# Patient Record
(demographics unavailable — no encounter records)

---

## 2025-04-29 NOTE — PLAN
[TextEntry] : CT abdomen and pelvis to assess the surgical site Follow-up in a month Reached out to Dr. Vaughn to clarify reason for referral

## 2025-04-29 NOTE — HISTORY OF PRESENT ILLNESS
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  Status post appendectomy and right hemicolectomy October 2022.  Most recent colonoscopy January 2022.  Most recent hemoglobin 12.8 most recent CEA 1.1 2022 Path report. 6.5 x 3.4 x 3.2 white-yellow cystic mass within the appendiceal lumen with yellow-green mucinous material.  Margins negative.  Not in need of treatment at that time.  February 2025 labs hemoglobin 13.4 MCV 88.8 white count 5.45 platelets 279 Normal CMP  R side bothers  no blood in stool  stable weight

## 2025-04-29 NOTE — ASSESSMENT
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  The CEA seems stable.  She is not anemic.  It seems like it was a low-grade malignancy because she had no chemo or radiation afterwards.  She does not know who the gastroenterologist who found the lesion was, where the procedure was performed, what the pathology report from the appendiceal carcinoma is, and also why the oncologist referred her here today.  She does complain of vague right-sided abdominal discomfort which sounds like adhesions from the surgical site.